# Patient Record
Sex: FEMALE | Race: WHITE | NOT HISPANIC OR LATINO | Employment: OTHER | URBAN - METROPOLITAN AREA
[De-identification: names, ages, dates, MRNs, and addresses within clinical notes are randomized per-mention and may not be internally consistent; named-entity substitution may affect disease eponyms.]

---

## 2024-05-31 ENCOUNTER — HOSPITAL ENCOUNTER (OUTPATIENT)
Dept: RADIOLOGY | Facility: CLINIC | Age: 74
Discharge: HOME | End: 2024-05-31
Payer: COMMERCIAL

## 2024-05-31 DIAGNOSIS — S99.921A RIGHT FOOT INJURY, INITIAL ENCOUNTER: ICD-10-CM

## 2024-05-31 DIAGNOSIS — S99.911A RIGHT ANKLE INJURY, INITIAL ENCOUNTER: ICD-10-CM

## 2024-05-31 PROCEDURE — 73610 X-RAY EXAM OF ANKLE: CPT | Mod: RT

## 2024-05-31 PROCEDURE — 73630 X-RAY EXAM OF FOOT: CPT | Mod: RT

## 2024-05-31 PROCEDURE — 73610 X-RAY EXAM OF ANKLE: CPT | Mod: RIGHT SIDE | Performed by: RADIOLOGY

## 2024-05-31 PROCEDURE — 73630 X-RAY EXAM OF FOOT: CPT | Mod: RIGHT SIDE | Performed by: RADIOLOGY

## 2025-01-27 ENCOUNTER — HOSPITAL ENCOUNTER (OUTPATIENT)
Dept: RADIOLOGY | Facility: CLINIC | Age: 75
Discharge: HOME | End: 2025-01-27
Payer: COMMERCIAL

## 2025-01-27 ENCOUNTER — OFFICE VISIT (OUTPATIENT)
Dept: URGENT CARE | Age: 75
End: 2025-01-27
Payer: COMMERCIAL

## 2025-01-27 VITALS
TEMPERATURE: 97.7 F | OXYGEN SATURATION: 97 % | SYSTOLIC BLOOD PRESSURE: 107 MMHG | RESPIRATION RATE: 14 BRPM | HEART RATE: 77 BPM | DIASTOLIC BLOOD PRESSURE: 70 MMHG

## 2025-01-27 DIAGNOSIS — M25.551 RIGHT HIP PAIN: ICD-10-CM

## 2025-01-27 DIAGNOSIS — M25.561 ACUTE PAIN OF RIGHT KNEE: Primary | ICD-10-CM

## 2025-01-27 DIAGNOSIS — M25.561 ACUTE PAIN OF RIGHT KNEE: ICD-10-CM

## 2025-01-27 PROCEDURE — 73502 X-RAY EXAM HIP UNI 2-3 VIEWS: CPT | Mod: RIGHT SIDE | Performed by: RADIOLOGY

## 2025-01-27 PROCEDURE — 73562 X-RAY EXAM OF KNEE 3: CPT | Mod: RT

## 2025-01-27 PROCEDURE — 1159F MED LIST DOCD IN RCRD: CPT | Performed by: STUDENT IN AN ORGANIZED HEALTH CARE EDUCATION/TRAINING PROGRAM

## 2025-01-27 PROCEDURE — 1160F RVW MEDS BY RX/DR IN RCRD: CPT | Performed by: STUDENT IN AN ORGANIZED HEALTH CARE EDUCATION/TRAINING PROGRAM

## 2025-01-27 PROCEDURE — 73562 X-RAY EXAM OF KNEE 3: CPT | Mod: RIGHT SIDE | Performed by: RADIOLOGY

## 2025-01-27 PROCEDURE — 1125F AMNT PAIN NOTED PAIN PRSNT: CPT | Performed by: STUDENT IN AN ORGANIZED HEALTH CARE EDUCATION/TRAINING PROGRAM

## 2025-01-27 PROCEDURE — 99213 OFFICE O/P EST LOW 20 MIN: CPT | Performed by: STUDENT IN AN ORGANIZED HEALTH CARE EDUCATION/TRAINING PROGRAM

## 2025-01-27 PROCEDURE — 73502 X-RAY EXAM HIP UNI 2-3 VIEWS: CPT | Mod: RT

## 2025-01-27 RX ORDER — MELOXICAM 7.5 MG/1
7.5 TABLET ORAL DAILY
Qty: 15 TABLET | Refills: 0 | Status: SHIPPED | OUTPATIENT
Start: 2025-01-27 | End: 2025-02-11

## 2025-01-27 ASSESSMENT — ENCOUNTER SYMPTOMS
GASTROINTESTINAL NEGATIVE: 1
CONSTITUTIONAL NEGATIVE: 1
ARTHRALGIAS: 1
CARDIOVASCULAR NEGATIVE: 1
RESPIRATORY NEGATIVE: 1

## 2025-01-27 ASSESSMENT — PAIN SCALES - GENERAL: PAINLEVEL_OUTOF10: 4

## 2025-01-27 NOTE — PROGRESS NOTES
Subjective   Patient ID: Criselda Montero is a 74 y.o. female. They present today with a chief complaint of Knee Pain (Right knee pain and swelling X 1 month. Pt denies any injury. ).    History of Present Illness    Knee Pain       Patient presents to the urgent care for a chief complaint of right knee and hip pain patient does have history of osteoporosis, no report of injury such as fall or trauma patient has stopped taking alendronate per PCP has taken for 5 years, patient notices pain worse with driving  Past Medical History  Allergies as of 01/27/2025 - Reviewed 01/27/2025   Allergen Reaction Noted    Tetracyclines Rash 01/01/1955       (Not in a hospital admission)       History reviewed. No pertinent past medical history.    History reviewed. No pertinent surgical history.         Review of Systems  Review of Systems   Constitutional: Negative.    HENT: Negative.     Respiratory: Negative.     Cardiovascular: Negative.    Gastrointestinal: Negative.    Musculoskeletal:  Positive for arthralgias.                                  Objective    Vitals:    01/27/25 1045   BP: 107/70   Pulse: 77   Resp: 14   Temp: 36.5 °C (97.7 °F)   SpO2: 97%     No LMP recorded.    Physical Exam  Vitals and nursing note reviewed.   Constitutional:       General: She is not in acute distress.     Appearance: Normal appearance. She is not ill-appearing, toxic-appearing or diaphoretic.   Musculoskeletal:         General: No swelling, deformity or signs of injury.      Comments: Upon examination of right hip and knee no presence of gross deformity no ecchymosis edema or erythema, patient is able to ambulate and bear weight does have full sensation of lower extremity, able to flex extend abduct and adduct at the hip, able to flex extend at knee able to plantarflex dorsiflex and circumduct at the ankle, cap refill is brisk less than 2 seconds, negative varus and valgus stress negative anterior drawer negative posterior sag negative  Apley's grind negative Rene's, negative Reanna's test, no report of tenderness over spinous processes negative slump test   Skin:     Findings: No bruising, erythema, lesion or rash.   Neurological:      General: No focal deficit present.      Mental Status: She is alert and oriented to person, place, and time.   Psychiatric:         Mood and Affect: Mood normal.         Behavior: Behavior normal.         Procedures    Point of Care Test & Imaging Results from this visit  Due to patient history of Osteoporosis will obtain x-ray of right hip and right knee  -Upon examination of radiographic imaging I do not appreciate any acute osseous injury such as fracture or dislocation  Diagnostic study results (if any) were reviewed by Dontrell Taylor PA-C.    Assessment/Plan   Allergies, medications, history, and pertinent labs/EKGs/Imaging reviewed by Dontrell Taylor PA-C.     Medical Decision Making  Patient will be placed on meloxicam, I did discuss with patient following up with orthopedics if no resolution or regression of symptoms in 7 to 10 days.  Information provided.  Patient verbalized understanding is agreeable to plan discharge emergent care A+O x 4 stable condition no signs of distress neurovascularly intact able to ambulate and bear weight  Orders and Diagnoses  Diagnoses and all orders for this visit:  Acute pain of right knee  -     XR knee right 3 views; Future  -     meloxicam (Mobic) 7.5 mg tablet; Take 1 tablet (7.5 mg) by mouth once daily for 15 days.  Right hip pain  -     XR hip right with pelvis when performed 2 or 3 views; Future  -     meloxicam (Mobic) 7.5 mg tablet; Take 1 tablet (7.5 mg) by mouth once daily for 15 days.      Medical Admin Record      Patient disposition: Home    Electronically signed by Dontrell Taylor PA-C  12:12 PM

## 2025-02-07 ENCOUNTER — OFFICE VISIT (OUTPATIENT)
Dept: ORTHOPEDIC SURGERY | Facility: CLINIC | Age: 75
End: 2025-02-07
Payer: COMMERCIAL

## 2025-02-07 VITALS — BODY MASS INDEX: 24.75 KG/M2 | HEIGHT: 64 IN | WEIGHT: 145 LBS

## 2025-02-07 DIAGNOSIS — M17.11 ARTHRITIS OF RIGHT KNEE: ICD-10-CM

## 2025-02-07 PROCEDURE — 1160F RVW MEDS BY RX/DR IN RCRD: CPT | Performed by: FAMILY MEDICINE

## 2025-02-07 PROCEDURE — 99203 OFFICE O/P NEW LOW 30 MIN: CPT | Performed by: FAMILY MEDICINE

## 2025-02-07 PROCEDURE — 1159F MED LIST DOCD IN RCRD: CPT | Performed by: FAMILY MEDICINE

## 2025-02-07 PROCEDURE — 3008F BODY MASS INDEX DOCD: CPT | Performed by: FAMILY MEDICINE

## 2025-02-07 PROCEDURE — 1036F TOBACCO NON-USER: CPT | Performed by: FAMILY MEDICINE

## 2025-02-07 RX ORDER — PSYLLIUM HUSK 0.4 G
1 CAPSULE ORAL ONCE
COMMUNITY

## 2025-02-07 RX ORDER — MELOXICAM 15 MG/1
15 TABLET ORAL DAILY
Qty: 30 TABLET | Refills: 0 | Status: SHIPPED | OUTPATIENT
Start: 2025-02-07

## 2025-02-07 ASSESSMENT — ENCOUNTER SYMPTOMS
LOSS OF SENSATION IN FEET: 0
OCCASIONAL FEELINGS OF UNSTEADINESS: 0
DEPRESSION: 0

## 2025-02-07 NOTE — LETTER
February 7, 2025     Dontrell Taylor PA-C  [address]  [place] [facility] [address]    Patient: Criselda Montero   YOB: 1950   Date of Visit: 2/7/2025       Dear Dr. Dontrell Taylor PA-C:    Thank you for referring Criselda Montero to me for evaluation. Below are my notes for this consultation.  If you have questions, please do not hesitate to call me. I look forward to following your patient along with you.       Sincerely,     Maldonado Shah MD      CC: No Recipients  ______________________________________________________________________________________      History of Present Illness   Chief Complaint   Patient presents with   • Right Knee - Pain     FOR A FEW MONTHS  NKI       The patient is 74 y.o. female  here with a complaint of right knee pain, referred by urgent care provider Dontrell Taylor.  Patient has been dealing with some right knee pain for the past 2 months or so, atraumatic in nature,'s worsening pain last week prompting her to be seen in the urgent care, she had x-rays obtained that showed some mild degenerative changes, she was given prescription for some meloxicam and recommended outpatient orthopedic follow-up, she has been wearing a knee brace as well, minimal change in symptoms over the past week.  She has pain more prominent over the lateral aspect of her knee.  She was having some pain in the hip but this seems to have gotten better with the meloxicam.  She says pain feels more focal to the knee at this time.  There is pain with walking, there is pain going up and down stairs.  She denies any significant swelling, no locking or catching, no significant instability, no popping or clicking.  She denies any significant knee problems in the past.  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    No past medical history on file.    Medication Documentation Review Audit       Reviewed  by Leila Man MA (Medical Assistant) on 02/07/25 at 0908      Medication Order Taking? Sig Documenting Provider Last Dose Status   meloxicam (Mobic) 7.5 mg tablet [id number]  Take 1 tablet (7.5 mg) by mouth once daily for 15 days. Dontrell Taylor PA-C  Active                    Allergies   Allergen Reactions   • Tetracyclines Rash       Social History     Socioeconomic History   • Marital status:      Spouse name: Not on file   • Number of children: Not on file   • Years of education: Not on file   • Highest education level: Not on file   Occupational History   • Not on file   Tobacco Use   • Smoking status: Never   • Smokeless tobacco: Never   Substance and Sexual Activity   • Alcohol use: Yes   • Drug use: Never   • Sexual activity: Not on file   Other Topics Concern   • Not on file   Social History Narrative   • Not on file     Social Drivers of Health     Financial Resource Strain: Low Risk  (9/6/2021)    Received from [facility]    Overall Financial Resource Strain (CARDIA)    • Difficulty of Paying Living Expenses: Not hard at all   Food Insecurity: Not on File (9/26/2024)    Received from [facility]    Food Insecurity    • Food: 0   Transportation Needs: No Transportation Needs (9/6/2021)    Received from [facility]    PRAPARE - Transportation    • Lack of Transportation (Medical): No    • Lack of Transportation (Non-Medical): No   Physical Activity: Insufficiently Active (9/6/2021)    Received from [facility]    Exercise Vital Sign    • Days of Exercise per Week: 3 days    • Minutes of Exercise per Session: 30 min   Stress: No Stress Concern Present (9/6/2021)    Received from [facility]    [place]izen of [place] of Occupational Health - Occupational Stress Questionnaire    • Feeling of Stress : Not at all   Social Connections: Not on File (9/22/2024)    Received from [facility]    Social Connections    •  Connectedness: 0   Intimate Partner Violence: Not on file   Housing Stability: Low Risk  (9/6/2021)    [facility]    Housing Stability Vital Sign    • Unable to Pay for Housing in the Last Year: No    • Number of Places Lived in the Last Year: 2    • Unstable Housing in the Last Year: No       No past surgical history on file.       Review of Systems   GENERAL: Negative  GI: Negative  MUSCULOSKELETAL: See HPI  SKIN: Negative  NEURO:  Negative     Physical Exam:    General/Constitutional: well appearing, no distress, appears stated age  HEENT: sclera clear  Respiratory: non labored breathing  Vascular: No edema, swelling or tenderness, except as noted in detailed exam.  Integumentary: No impressive skin lesions present, except as noted in detailed exam.  Neurological:  Alert and oriented   Psychological:  Normal mood and affect.  Musculoskeletal: Normal, except as noted in detailed exam and in HPI.  Normal gait, unassisted      Right knee: Normal appearance, no swelling, no skin changes.  No joint effusion is present.  There is some mild medial and lateral joint line tenderness.  Range of motion from 0 to 120 degrees of flexion, there is discomfort at endrange of flexion.  There is pain with both resisted knee flexion and extension but no significant weakness.  She has pain with Rene testing.  Negative Lachman, negative posterior drawer.  Stable to varus and valgus stress.      Imaging: X-rays of right knee from 1/27/2025 independently reviewed, there is some mild medial joint space narrowing and osteophytosis, no acute abnormalities are seen      Assessment   1. Arthritis of right knee  Referral to Physical Therapy    meloxicam (Mobic) 15 mg tablet            Plan: Right knee pain thought to be secondary to aggravation of underlying osteoarthritis, atraumatic in nature, ongoing over the past 2 months.  Discussed further workup and treatment.  I did place referral to physical  therapy, I did give her a refill on her meloxicam, we discussed consideration of injection but she opted to hold off on this for now, may consider in the next month or so, she is going on vacation in around 1 month, would like to see how she is response to conservative treatment.  She will follow-up in 3-4 weeks for reassessment.

## 2025-02-07 NOTE — PROGRESS NOTES
History of Present Illness   Chief Complaint   Patient presents with    Right Knee - Pain     FOR A FEW MONTHS  NKI       The patient is 74 y.o. female  here with a complaint of right knee pain, referred by urgent care provider Dontrell Taylor.  Patient has been dealing with some right knee pain for the past 2 months or so, atraumatic in nature,'s worsening pain last week prompting her to be seen in the urgent care, she had x-rays obtained that showed some mild degenerative changes, she was given prescription for some meloxicam and recommended outpatient orthopedic follow-up, she has been wearing a knee brace as well, minimal change in symptoms over the past week.  She has pain more prominent over the lateral aspect of her knee.  She was having some pain in the hip but this seems to have gotten better with the meloxicam.  She says pain feels more focal to the knee at this time.  There is pain with walking, there is pain going up and down stairs.  She denies any significant swelling, no locking or catching, no significant instability, no popping or clicking.  She denies any significant knee problems in the past.  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    No past medical history on file.    Medication Documentation Review Audit       Reviewed by Leila Man MA (Medical Assistant) on 02/07/25 at 0908      Medication Order Taking? Sig Documenting Provider Last Dose Status   meloxicam (Mobic) 7.5 mg tablet [id number]  Take 1 tablet (7.5 mg) by mouth once daily for 15 days. Dontrell Taylor PA-C  Active                    Allergies   Allergen Reactions    Tetracyclines Rash       Social History     Socioeconomic History    Marital status:      Spouse name: Not on file    Number of children: Not on file    Years of education: Not on file    Highest education level: Not on file   Occupational History    Not on file   Tobacco Use    Smoking  status: Never    Smokeless tobacco: Never   Substance and Sexual Activity    Alcohol use: Yes    Drug use: Never    Sexual activity: Not on file   Other Topics Concern    Not on file   Social History Narrative    Not on file     Social Drivers of Health     Financial Resource Strain: Low Risk  (9/6/2021)    Received from [facility]    Overall Financial Resource Strain (CARDIA)     Difficulty of Paying Living Expenses: Not hard at all   Food Insecurity: Not on File (9/26/2024)    Received from [facility]    Food Insecurity     Food: 0   Transportation Needs: No Transportation Needs (9/6/2021)    Received from [facility]    PRAPARE - Transportation     Lack of Transportation (Medical): No     Lack of Transportation (Non-Medical): No   Physical Activity: Insufficiently Active (9/6/2021)    Received from [facility]    Exercise Vital Sign     Days of Exercise per Week: 3 days     Minutes of Exercise per Session: 30 min   Stress: No Stress Concern Present (9/6/2021)    Received from [facility]    [place] of Occupational Health - Occupational Stress Questionnaire     Feeling of Stress : Not at all   Social Connections: Not on File (9/22/2024)    Received from [facility]    Social Connections     Connectedness: 0   Intimate Partner Violence: Not on file   Housing Stability: Low Risk  (9/6/2021)    Received from [facility]    Housing Stability Vital Sign     Unable to Pay for Housing in the Last Year: No     Number of Places Lived in the Last Year: 2     Unstable Housing in the Last Year: No       No past surgical history on file.       Review of Systems   GENERAL: Negative  GI: Negative  MUSCULOSKELETAL: See HPI  SKIN: Negative  NEURO:  Negative     Physical Exam:    General/Constitutional: well appearing, no distress, appears stated age  HEENT: sclera clear  Respiratory: non labored breathing  Vascular:  No edema, swelling or tenderness, except as noted in detailed exam.  Integumentary: No impressive skin lesions present, except as noted in detailed exam.  Neurological:  Alert and oriented   Psychological:  Normal mood and affect.  Musculoskeletal: Normal, except as noted in detailed exam and in HPI.  Normal gait, unassisted      Right knee: Normal appearance, no swelling, no skin changes.  No joint effusion is present.  There is some mild medial and lateral joint line tenderness.  Range of motion from 0 to 120 degrees of flexion, there is discomfort at endrange of flexion.  There is pain with both resisted knee flexion and extension but no significant weakness.  She has pain with Rene testing.  Negative Lachman, negative posterior drawer.  Stable to varus and valgus stress.      Imaging: X-rays of right knee from 1/27/2025 independently reviewed, there is some mild medial joint space narrowing and osteophytosis, no acute abnormalities are seen      Assessment   1. Arthritis of right knee  Referral to Physical Therapy    meloxicam (Mobic) 15 mg tablet            Plan: Right knee pain thought to be secondary to aggravation of underlying osteoarthritis, atraumatic in nature, ongoing over the past 2 months.  Discussed further workup and treatment.  I did place referral to physical therapy, I did give her a refill on her meloxicam, we discussed consideration of injection but she opted to hold off on this for now, may consider in the next month or so, she is going on vacation in around 1 month, would like to see how she is response to conservative treatment.  She will follow-up in 3-4 weeks for reassessment.

## 2025-02-24 ENCOUNTER — APPOINTMENT (OUTPATIENT)
Dept: ORTHOPEDIC SURGERY | Facility: CLINIC | Age: 75
End: 2025-02-24
Payer: COMMERCIAL

## 2025-02-24 ENCOUNTER — APPOINTMENT (OUTPATIENT)
Dept: PHYSICAL THERAPY | Facility: CLINIC | Age: 75
End: 2025-02-24
Payer: COMMERCIAL

## 2025-02-24 DIAGNOSIS — M25.561 RIGHT KNEE PAIN: Primary | ICD-10-CM

## 2025-02-24 DIAGNOSIS — M17.11 ARTHRITIS OF RIGHT KNEE: ICD-10-CM

## 2025-02-24 PROCEDURE — 97110 THERAPEUTIC EXERCISES: CPT | Mod: GP | Performed by: PHYSICAL THERAPIST

## 2025-02-24 PROCEDURE — 97161 PT EVAL LOW COMPLEX 20 MIN: CPT | Mod: GP | Performed by: PHYSICAL THERAPIST

## 2025-02-24 ASSESSMENT — ENCOUNTER SYMPTOMS
DEPRESSION: 0
OCCASIONAL FEELINGS OF UNSTEADINESS: 0
LOSS OF SENSATION IN FEET: 0

## 2025-02-24 NOTE — PROGRESS NOTES
PHYSICAL THERAPY   EVALUATION & TREATMENT NOTE    Patient Name:  Criselda Montero   Patient MRN: [id number]  Date: 2/24/2025    Time Calculation  Start Time: 1233  Stop Time: 1318  Time Calculation (min): 45 min    Insurance:  Insurance Type: [place]  Visit number: 1 (plus used 1 more this year)  Approved # of visits 50  Authorization Needed: no  $35 copay    General   Reason for visit: R knee pain   Referred by: Maldonado Oconnor diagnoses:   Problem List Items Addressed This Visit             ICD-10-CM    Right knee pain - Primary M25.561    Relevant Orders    Follow Up In Physical Therapy     Other Visit Diagnoses         Codes    Arthritis of right knee     M17.11            ASSESSMENT   75 y/o female c/o R knee pain for the past few months without trauma, improving since starting exercises from orthopedic. She presents today with some painful end range R knee flexion ROM, decreased B hip strength, poor squatting alignment worse on L possibly due to scoliosis, affecting her ability to navigate stairs, walk long distances, squat, and perform other ADLs. Patient will benefit from skilled therapy to address these impairments and return to prior level of functioning.     PLAN   Goals  1. Pt will be independent in HEP in 6-8 weeks  2. Pt will report 0-1/10 R knee pain at rest and with activity in 6-8 weeks.  3. Pt will demonstrate 5/5 B hip strength to return to ambulating without brace in 6-8 weeks  4. Pt will demonstrate full and pain free R knee ROM to improve comfort in ADLS in 6-8 weeks.  5. Pt will report LEFS score >65/80 in 6-8 weeks.    Plan of care to include: therapeutic exercise, therapeutic activity, soft tissue mobilization, joint mobilizations, neuromuscular re-education, pt education, self care activities, home program, vaso/cryotherapy, gait training, dry needling    1x/week for 6-8 weeks. Pt will follow up upon getting back from [place].    Patient agrees to plan of care.    SUBJECTIVE   Reviewed  medical history form with patient and medical screening assessed     Started to notice pain a few months ago but it was diffuse and intermittent down the whole R LE lateral side. She thought it was ITB syndrome and tried some exercises for that. Last month it suddenly got worse especially with driving a car. Went to [facility] and had x-rays of her knee and hip with mild arthritis. Was prescribed meloxicam. Went to ortho who gave her some exercises that she's doing every day and they have been helping - 4 way SLR, hs stretch, quad stretch, calf raises. Also has a hard time with mini squats. Has a walking stick that she uses when it's icy. Has a knee brace that she wears when she's walking a lot.     Did have a fall last summer and thought she broke her ankle but she didn't. And no knee pain at that time. Rolled her ankle on a bad step.     Is taking a trip on March 8 for 2.5 weeks in [place].     Pain:  Lateral R knee pain sometimes referring up and down the leg. Also some hip pain. Denies N/T. One time had an episode of her knee giving way during a flare up.   At worst, pain is 3/10  Exacerbating factors include twisting her hip into ER, stepping incorrectly.   At best, pain is 0/10  Relieving factors include rest, ice    Function:  Has to be careful, doesn't walk for long periods of time. 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 Will take a break as needed throughout her day.    Social:   Exercise - walking the dog and the new HEP that her doctor gave her.     Pt goals: decrease pain, increase strength & stability    Language: English  Potential barriers to treatment: continue to assess    Precautions:    PMH: L humerus fx 2016, L wrist fx 2019, scoliosis diagnosed a few years ago affecting her L hip  Fall risk -  low      OBJECTIVE *=painful  Gait Ambulates with stabilizing brace    Observation/Posture  Sit to  "stand - L toe out and valgus collapse    Balance  SLS R 2\", L 8\"     Palpation  (+) TTP R lateral knee    Range of Motion (R, L in degrees)  Lumbar WNL with min limitation to R SB  Hip flexion min dec*, WNL  Knee Flexion 128*, 130  Knee Extension 0, 0    Flexibility (R, L)  Hamstrings min dec, min dec   Quads mod-max dec B    Strength (R, L MMT out of 5)  Hip Flexion 4+, 5  Hip Extension 3+, 3+  Hip Abduction 4-  Hip IR 4+, 5  Hip ER 5, 5  Knee Flexion 4+, 5  Knee Extension 4+, 5    Outcome Measures  LEFS = 39/80    Today's treatment and initial evaluation included:  - Patient education regarding diagnosis, prognosis, contributing factors, comorbidities, activity modification, symptom monitoring, importance of HEP, role of PT, postural re-education, appropriate shoe wear, work ergonomics, body mechanics, reviewed office cancel/no show policy.  - Review of POC   - Therapeutic Exercise & given HEP handout:  Access Code: [email or web address]  - Supine Quadriceps Stretch with Strap on Table  - 1 x daily - 3 reps - 30 sec hold  - Modified Adam Stretch  - 1 x daily - 3 reps - 30 sec hold  - Standing Hip Abduction with Counter Support  - 1 x daily - 3 sets - 10 reps  - Standing Hip Extension with Counter Support  - 1 x daily - 3 sets - 10 reps  - Mini Squat with Counter Support  - 1 x daily - 2 sets - 10 reps    PT Evaluation Time Entry  PT Evaluation (Low) Time Entry: 35  PT Therapeutic Procedures Time Entry  Therapeutic Exercise Time Entry: 10                  "

## 2025-03-26 ENCOUNTER — TREATMENT (OUTPATIENT)
Dept: PHYSICAL THERAPY | Facility: CLINIC | Age: 75
End: 2025-03-26
Payer: COMMERCIAL

## 2025-03-26 DIAGNOSIS — M25.561 RIGHT KNEE PAIN: ICD-10-CM

## 2025-03-26 PROCEDURE — 97110 THERAPEUTIC EXERCISES: CPT | Mod: GP | Performed by: PHYSICAL THERAPIST

## 2025-03-26 NOTE — PROGRESS NOTES
PHYSICAL THERAPY   TREATMENT NOTE    Patient Name:  Criselda Montero   Patient MRN: [id number]  Date: 3/26/2025    Time Calculation  Start Time: 0200  Stop Time: 0250  Time Calculation (min): 50 min    Insurance:  Insurance Type: [employer]  Visit number: 2  (Plus used 1 more this year)  Approved # of visits 50  Authorization Needed: no  $35 copay    General   Reason for visit: R knee pain   Referred by: Maldonado Oconnor diagnoses:   Problem List Items Addressed This Visit             ICD-10-CM       Musculoskeletal and Injuries    Right knee pain M25.561       Assessment: Pt presents with improved R knee pain since initial eval 4 weeks ago. Able to tolerate prolonged walking while wearing brace during recent [place] trip. Continues to demonstrate fear of falling and ascending stairs with RLE due to intermittent knee instability - pt had fall on L side prior to vacation, but has no long term pain. Able to perform HEP and new exercises with proper form with minimal knee discomfort; some R knee instability with sidestepping exercise. Educated on using treadmill/ stationary bike/ swimming for exercise, weaning off meloxicam and ultimately her knee brace as tolerated, monitoring sx, continue to increase movement to decrease stiffness, and regularly maintain HEP schedule. Will hold therapy for 30 days and patient encouraged to return to PT is needed.      Plan: Hold therapy for 30 days, if pt does not need to return, consider today's session discharge.      Subjective:  Pt R knee pain is feeling overall better since vacation to [employer]. After evaluation, pt got flu and didn't do HEP due to fatigue. Fell on deck and landed on L hip. Wore brace majority of time in [place] and believed walking helped knee pain. Prolonged walking causes R knee pain which has improved. Only wears brace with unstable ground or crowded places. Short distances she doesn't wear brace and feels okay. Difficulty with lying on L side due to hip pain  "from fall. Stiff after prolonged sitting. Difficulty with ascending stairs with RLE due to comfort and fear. Currently taking meloxicam intermittently. Wants to try treadmill for exercise. Didn't do HEP on vacation, but began to start when she returned a week ago. Pt plans to rejoin local gym. Encouraged by progress and addition of new HEP exercises.     - Pain (0-10): 1/10 R knee, 0/10 L hip     Precautions  PMH:  L humerus fx 2016, L wrist fx 2019, scoliosis diagnosed a few years ago affecting her L hip   Fall Risk: low    Objective  Access Code: [email or web address] (quad and juni stretch, standing hip abd and ext, mini squat, bridge, sidestepping, marches)    Treatment Performed:   Therapeutic Exercise:    - reviewed HEP   -standing mini squat x 5   -supine quadriceps stretch w/ strap 2x30  - nustep L4 x 5\"  - shuttle B mini squat x 20  50# // SL mini squat x 10  25#  - sidestepping w/ yellow band at barre x 4 (added to HEP)  - standing marches x 2\" (added to HEP)  - bridges w/ pelvic tilt and 5\" hold x 10 (added to HEP)  - educated on new revised HEP and activity modification based on sx      Patient was seen under the guidance of a licensed physical therapist who made all clinical decisions.   Mandy Flores, SPT             PT Therapeutic Procedures Time Entry  Therapeutic Exercise Time Entry: 50                   "

## 2025-04-10 ENCOUNTER — APPOINTMENT (OUTPATIENT)
Dept: INTEGRATIVE MEDICINE | Facility: CLINIC | Age: 75
End: 2025-04-10
Payer: COMMERCIAL